# Patient Record
Sex: MALE | Race: WHITE | Employment: FULL TIME | URBAN - METROPOLITAN AREA
[De-identification: names, ages, dates, MRNs, and addresses within clinical notes are randomized per-mention and may not be internally consistent; named-entity substitution may affect disease eponyms.]

---

## 2017-05-12 ENCOUNTER — OFFICE VISIT (OUTPATIENT)
Dept: FAMILY MEDICINE CLINIC | Age: 52
End: 2017-05-12

## 2017-05-12 VITALS
OXYGEN SATURATION: 98 % | RESPIRATION RATE: 16 BRPM | WEIGHT: 146 LBS | SYSTOLIC BLOOD PRESSURE: 128 MMHG | TEMPERATURE: 99 F | DIASTOLIC BLOOD PRESSURE: 68 MMHG | BODY MASS INDEX: 22.91 KG/M2 | HEIGHT: 67 IN | HEART RATE: 94 BPM

## 2017-05-12 DIAGNOSIS — J02.9 VIRAL PHARYNGITIS: Primary | ICD-10-CM

## 2017-05-12 DIAGNOSIS — Z20.818 STREP THROAT EXPOSURE: ICD-10-CM

## 2017-05-12 LAB
S PYO AG THROAT QL: NEGATIVE
VALID INTERNAL CONTROL?: YES

## 2017-05-12 NOTE — PROGRESS NOTES
Subjective:   Re Montoya is a 47 y.o. male who complains of sore throat for 2 days, stable since that time. He also reports some congestion, cough, lack of appetite, and mild nausea. He is concerned he may have strep as his daughter recently had strep. He denies a history of fevers, shortness of breath and wheezing. Evaluation to date: none. Treatment to date: none. Patient does not smoke cigarettes. Relevant PMH:   Past Medical History:   Diagnosis Date    Depression     Dyspepsia and other specified disorders of function of stomach     Elevated triglycerides with high cholesterol     Gallstones 7/10/2013    GERD (gastroesophageal reflux disease)     Headache      Past Surgical History:   Procedure Laterality Date    HX LAP CHOLECYSTECTOMY  8/22/13    by Dr Jony Salinas Levofloxacin Anxiety and Other (comments)     Heart palpitations  Depression         Review of Systems  Pertinent items are noted in HPI. Objective:     Visit Vitals    /68    Pulse 94    Temp 99 °F (37.2 °C) (Oral)    Resp 16    Ht 5' 7\" (1.702 m)    Wt 146 lb (66.2 kg)    SpO2 98%    BMI 22.87 kg/m2     General:  alert, cooperative, no distress   Eyes: negative   Ears: normal TM's and external ear canals AU   Sinuses: Normal paranasal sinuses without tenderness   Mouth:  Lips, mucosa, and tongue normal. Teeth and gums normal and abnormal findings: mild oropharyngeal erythema   Neck: supple, symmetrical, trachea midline and no adenopathy. Heart: S1 and S2 normal, no murmurs noted. Lungs: clear to auscultation bilaterally   Abdomen: soft, non-tender.  Bowel sounds normal. No masses,  no organomegaly        Results for orders placed or performed in visit on 05/12/17   AMB POC RAPID STREP A   Result Value Ref Range    VALID INTERNAL CONTROL POC Yes     Group A Strep Ag Negative Negative       Assessment/Plan:       ICD-10-CM ICD-9-CM    1. Viral pharyngitis J02.9 462 AMB POC RAPID STREP A   2. Strep throat exposure Z20.818 V01.89      Suggested symptomatic OTC remedies. RTC prn.       Deja Khan NP

## 2017-05-12 NOTE — MR AVS SNAPSHOT
Visit Information Date & Time Provider Department Dept. Phone Encounter #  
 5/12/2017 11:30 AM Padmini Betancourt NP [address] [facility] [phone] [id number] Follow-up Instructions Return if symptoms worsen or fail to improve. Upcoming Health Maintenance Date Due DTaP/Tdap/Td series (1 - Tdap) 2/3/1987 FOBT Q 1 YEAR AGE 50-75 2/3/2016 INFLUENZA AGE 9 TO ADULT 8/1/2017 Allergies as of 5/12/2017  Review Complete On: 5/12/2017 By: Padmini Betancourt NP Severity Noted Reaction Type Reactions Levofloxacin  01/20/2013    Anxiety, Other (comments) Heart palpitations Depression Current Immunizations  Never Reviewed No immunizations on file. Not reviewed this visit You Were Diagnosed With   
  
 Codes Comments Viral pharyngitis    -  Primary ICD-10-CM: J02.9 ICD-9-CM: 187 Vitals BP Pulse Temp Resp Height(growth percentile) Weight(growth percentile) 128/68 94 99 °F (37.2 °C) (Oral) 16 5' 7\" (1.702 m) 146 lb (66.2 kg) SpO2 BMI Smoking Status 98% 22.87 kg/m2 Never Smoker BMI and BSA Data Body Mass Index Body Surface Area  
 22.87 kg/m 2 1.77 m 2 Preferred Pharmacy Pharmacy Name Phone [address], [address] Your Updated Medication List  
  
   
This list is accurate as of: 5/12/17 12:05 PM.  Always use your most recent med list.  
  
  
  
  
 cholecalciferol (vitamin D3) 2,000 unit Tab Take  by mouth. LIPITOR 10 mg tablet Generic drug:  atorvastatin Take  by mouth daily. multivitamin tablet Commonly known as:  ONE A DAY Take 1 Tab by mouth daily. PriLOSEC 20 mg capsule Generic drug:  omeprazole Take 20 mg by mouth daily. SEROquel 25 mg tablet Generic drug:  QUEtiapine Take  by mouth nightly. ZOLOFT 100 mg tablet Generic drug:  sertraline Take 100 mg by mouth daily. We Performed the Following AMB POC RAPID STREP A [30620 CPT(R)] Follow-up Instructions Return if symptoms worsen or fail to improve. Patient Instructions Sore Throat: Care Instructions Your Care Instructions Infection by bacteria or a virus causes most sore throats. Cigarette smoke, dry air, air pollution, allergies, and yelling can also cause a sore throat. Sore throats can be painful and annoying. Fortunately, most sore throats go away on their own. If you have a bacterial infection, your doctor may prescribe antibiotics. Follow-up care is a key part of your treatment and safety. Be sure to make and go to all appointments, and call your doctor if you are having problems. It's also a good idea to know your test results and keep a list of the medicines you take. How can you care for yourself at home? · If your doctor prescribed antibiotics, take them as directed. Do not stop taking them just because you feel better. You need to take the full course of antibiotics. · Gargle with warm salt water once an hour to help reduce swelling and relieve discomfort. Use 1 teaspoon of salt mixed in 1 cup of warm water. · Take an over-the-counter pain medicine, such as acetaminophen (Tylenol), ibuprofen (Advil, Motrin), or naproxen (Aleve). Read and follow all instructions on the label. · Be careful when taking over-the-counter cold or flu medicines and Tylenol at the same time. Many of these medicines have acetaminophen, which is Tylenol. Read the labels to make sure that you are not taking more than the recommended dose. Too much acetaminophen (Tylenol) can be harmful. · Drink plenty of fluids. Fluids may help soothe an irritated throat. Hot fluids, such as tea or soup, may help decrease throat pain. · Use over-the-counter throat lozenges to soothe pain. Regular cough drops or hard candy may also help.  These should not be given to young children because of the risk of choking. · Do not smoke or allow others to smoke around you. If you need help quitting, talk to your doctor about stop-smoking programs and medicines. These can increase your chances of quitting for good. · Use a vaporizer or humidifier to add moisture to your bedroom. Follow the directions for cleaning the machine. When should you call for help? Call your doctor now or seek immediate medical care if: 
· You have new or worse trouble swallowing. · Your sore throat gets much worse on one side. Watch closely for changes in your health, and be sure to contact your doctor if you do not get better as expected. Where can you learn more? Go to http://jimenez-charlie.info/. Enter [id number] [id number] [phone] in the search box to learn more about \"Sore Throat: Care Instructions. \" Current as of: July 29, 2016 Content Version: 11.2 © 3893-6072 FiFully. Care instructions adapted under license by Ryla (which disclaims liability or warranty for this information). If you have questions about a medical condition or this instruction, always ask your healthcare professional. [place] [address] any warranty or liability for your use of this information. Introducing [facility]! Dear Sue Phillips: Thank you for requesting a Voalte account. Our records indicate that you already have an active Voalte account. You can access your account anytime at [email or web address] Did you know that you can access your hospital and ER discharge instructions at any time in Voalte? You can also review all of your test results from your hospital stay or ER visit. Additional Information If you have questions, please visit the Frequently Asked Questions section of the Voalte website at [email or web address] Remember, Voalte is NOT to be used for urgent needs. For medical emergencies, dial 911. Now available from your iPhone and Android! Please provide this summary of care documentation to your next provider. Your primary care clinician is listed as Hayde Rose. If you have any questions after today's visit, please call [phone].

## 2017-05-12 NOTE — PATIENT INSTRUCTIONS
Sore Throat: Care Instructions  Your Care Instructions    Infection by bacteria or a virus causes most sore throats. Cigarette smoke, dry air, air pollution, allergies, and yelling can also cause a sore throat. Sore throats can be painful and annoying. Fortunately, most sore throats go away on their own. If you have a bacterial infection, your doctor may prescribe antibiotics. Follow-up care is a key part of your treatment and safety. Be sure to make and go to all appointments, and call your doctor if you are having problems. It's also a good idea to know your test results and keep a list of the medicines you take. How can you care for yourself at home? · If your doctor prescribed antibiotics, take them as directed. Do not stop taking them just because you feel better. You need to take the full course of antibiotics. · Gargle with warm salt water once an hour to help reduce swelling and relieve discomfort. Use 1 teaspoon of salt mixed in 1 cup of warm water. · Take an over-the-counter pain medicine, such as acetaminophen (Tylenol), ibuprofen (Advil, Motrin), or naproxen (Aleve). Read and follow all instructions on the label. · Be careful when taking over-the-counter cold or flu medicines and Tylenol at the same time. Many of these medicines have acetaminophen, which is Tylenol. Read the labels to make sure that you are not taking more than the recommended dose. Too much acetaminophen (Tylenol) can be harmful. · Drink plenty of fluids. Fluids may help soothe an irritated throat. Hot fluids, such as tea or soup, may help decrease throat pain. · Use over-the-counter throat lozenges to soothe pain. Regular cough drops or hard candy may also help. These should not be given to young children because of the risk of choking. · Do not smoke or allow others to smoke around you. If you need help quitting, talk to your doctor about stop-smoking programs and medicines.  These can increase your chances of quitting for good. · Use a vaporizer or humidifier to add moisture to your bedroom. Follow the directions for cleaning the machine. When should you call for help? Call your doctor now or seek immediate medical care if:  · You have new or worse trouble swallowing. · Your sore throat gets much worse on one side. Watch closely for changes in your health, and be sure to contact your doctor if you do not get better as expected. Where can you learn more? Go to http://jimenez-charlie.info/. Enter [phone] [id number] [phone] in the search box to learn more about \"Sore Throat: Care Instructions. \"  Current as of: July 29, 2016  Content Version: 11.2  © 6296-6440 Fon, Incorporated. Care instructions adapted under license by HeatGenie (which disclaims liability or warranty for this information). If you have questions about a medical condition or this instruction, always ask your healthcare professional. [address] any warranty or liability for your use of this information.

## 2017-05-12 NOTE — PROGRESS NOTES
Chief Complaint   Patient presents with    Sore Throat     for 2 days, daughter tested positive for Strep

## 2018-07-24 ENCOUNTER — HOSPITAL ENCOUNTER (EMERGENCY)
Age: 53
Discharge: HOME | End: 2018-07-24
Attending: EMERGENCY MEDICINE
Payer: COMMERCIAL

## 2018-07-24 VITALS
OXYGEN SATURATION: 98 % | HEIGHT: 67 IN | TEMPERATURE: 98 F | DIASTOLIC BLOOD PRESSURE: 67 MMHG | HEART RATE: 78 BPM | BODY MASS INDEX: 22.76 KG/M2 | WEIGHT: 145 LBS | SYSTOLIC BLOOD PRESSURE: 125 MMHG | RESPIRATION RATE: 15 BRPM

## 2018-07-24 DIAGNOSIS — F33.1 MODERATE EPISODE OF RECURRENT MAJOR DEPRESSIVE DISORDER (HCC): Primary | ICD-10-CM

## 2018-07-24 PROCEDURE — 90791 PSYCH DIAGNOSTIC EVALUATION: CPT

## 2018-07-24 PROCEDURE — 99284 EMERGENCY DEPT VISIT MOD MDM: CPT

## 2018-07-24 PROCEDURE — 74011250637 HC RX REV CODE- 250/637: Performed by: EMERGENCY MEDICINE

## 2018-07-24 RX ORDER — LORAZEPAM 1 MG/1
1 TABLET ORAL
Qty: 6 TAB | Refills: 0 | Status: SHIPPED | OUTPATIENT
Start: 2018-07-24

## 2018-07-24 RX ORDER — LORAZEPAM 1 MG/1
1 TABLET ORAL
Status: COMPLETED | OUTPATIENT
Start: 2018-07-24 | End: 2018-07-24

## 2018-07-24 RX ADMIN — LORAZEPAM 1 MG: 1 TABLET ORAL at 21:56

## 2018-07-24 NOTE — LETTER
[facility] EMERGENCY DEPT 
[address] [facility] [address] [address] [address] 
[phone] Work/School Note Date: 7/24/2018 To Whom It May concern: 
 
Cristal Johnson was seen and treated today in the emergency room by the following provider(s): 
Attending Provider: Rashid Bolanos MD.   
 
Cristal Johnson may return to work on 7/26/2018. Sincerely, Rashid Bolanos MD

## 2018-07-24 NOTE — LETTER
[facility] EMERGENCY DEPT 
[address] [place] [address] [address] [address] 
[phone] Work/School Note Date: 7/24/2018 To Whom It May concern: 
 
Mary Allen was seen and treated today in the emergency room by the following provider(s): 
Attending Provider: Jag Olmos MD.   
 
Mary Allen may return to work on 7/26/2018. Sincerely, Jag Olmos MD

## 2018-07-25 NOTE — ED TRIAGE NOTES
Triage note:  Pt arrived with wife and stated he isn't sure why he is here. Per wife pt was referred here by his therapist for depression medication. Wife stated pt isn't eating and boyd all the time.   Pt denies feeling suicidal.

## 2018-07-25 NOTE — DISCHARGE INSTRUCTIONS
Depression and Chronic Disease: Care Instructions  Your Care Instructions    A chronic disease is one that you have for a long time. Some chronic diseases can be controlled, but they usually cannot be cured. Depression is common in people with chronic diseases, but it often goes unnoticed. Many people have concerns about seeking treatment for a mental health problem. You may think it's a sign of weakness, or you don't want people to know about it. It's important to overcome these reasons for not seeking treatment. Treating depression or anxiety is good for your health. Follow-up care is a key part of your treatment and safety. Be sure to make and go to all appointments, and call your doctor if you are having problems. It's also a good idea to know your test results and keep a list of the medicines you take. How can you care for yourself at home? Watch for symptoms of depression  The symptoms of depression are often subtle at first. You may think they are caused by your disease rather than depression. Or you may think it is normal to be depressed when you have a chronic disease. If you are depressed you may:  · Feel sad or hopeless. · Feel guilty or worthless. · Not enjoy the things you used to enjoy. · Feel hopeless, as though life is not worth living. · Have trouble thinking or remembering. · Have low energy, and you may not eat or sleep well. · Pull away from others. · Think often about death or killing yourself. (Keep the numbers for these national suicide hotlines: 8-469-747-TALK [1-813.230.2517] and 4-578-TELPSDU [1-166.296.5410]. )  Get treatment  By treating your depression, you can feel more hopeful and have more energy. If you feel better, you may take better care of yourself, so your health may improve. · Talk to your doctor if you have any changes in mood during treatment for your disease. · Ask your doctor for help.  Counseling, antidepressant medicine, or a combination of the two can help most people with depression. Often a combination works best. Counseling can also help you cope with having a chronic disease. When should you call for help? Call 911 anytime you think you may need emergency care. For example, call if:    · You feel like hurting yourself or someone else.     · Someone you know has depression and is about to attempt or is attempting suicide.   [facility] your doctor now or seek immediate medical care if:    · You hear voices.     · Someone you know has depression and:  ¨ Starts to give away his or her possessions. ¨ Uses illegal drugs or drinks alcohol heavily. ¨ Talks or writes about death, including writing suicide notes or talking about guns, knives, or pills. ¨ Starts to spend a lot of time alone. ¨ Acts very aggressively or suddenly appears calm.    Watch closely for changes in your health, and be sure to contact your doctor if:    · You do not get better as expected. Where can you learn more? Go to http://jimenez-charlie.info/. Enter I240 in the search box to learn more about \"Depression and Chronic Disease: Care Instructions. \"  Current as of: December 7, 2017  Content Version: 11.7  © 4268-7835 Centrillion Biosciences, Incorporated. Care instructions adapted under license by Profig (which disclaims liability or warranty for this information). If you have questions about a medical condition or this instruction, always ask your healthcare professional. [address] any warranty or liability for your use of this information.

## 2018-07-25 NOTE — BSMART NOTE
Comprehensive Assessment Form Part 1      Section I - Disposition    Axis I - Major Depressive Disorder                 Anxiety Disorder                Alcohol Dependence                   Axis II - Deferred  Axis III -   Past Medical History Date Comments      Headache(784.0) [R51]       Depression [F32.9]       Dyspepsia and other specified disorders of function of stomach [K31.89, R10.13]       Gallstones [K80.20] 7/10/2013      GERD (gastroesophageal reflux disease) [K21.9]       Elevated triglycerides with high cholesterol [E78.2]         Axis IV - No reported stressors  Axis V -       The Medical Doctor to Psychiatrist conference was not completed. The Medical Doctor is in agreement with Psychiatrist disposition because of (reason) patient is not seeking an admission and does not require a TDO. The plan is discharge, patient given resources to connect with psychiatrist.  The on-call Psychiatrist consulted was Dr. Rainer Ayala. The admitting Psychiatrist will be Dr. Nidia Hinkle. The admitting Diagnosis is none. The Payor source is [employer]. The name of the representative was . This was approved for  days. The authorization number is . Section II - Integrated Summary  Summary:  Patient is 47year old male,Pt arrived with wife and stated he isn't sure why he is here. Per wife pt was referred here by his therapist for depression medication. Wife stated pt isn't eating and boyd all the time. Pt denies feeling suicidal. Assessment completed via tele psych, patient denied suicidal/ homicidal thoughts; denied hallucinations. Patient hasnot been previously hospitalized. Patient was seen by a psychiatrist about four years ago and after his insurance changed he could not see doc anymore. Patient has continued to be medicated by his PCP with Zoloft prescription. Patient reported over the past 4-5 days he has had increased depression without reason, he has been crying and not eating.  Patient denied any changes or stressors. Patient reported that he was on vacation prior to having the increased depression. Patient reported he hasnot been to work over past two days due to how he was feeling. Patient has been seen by a therapist. Patient reported that he has suffered from depression for a while but he hasnot had an episode like this in the past. Patient reported feeling safe with himself, and his wife feels safe with him at home. They are willing to connect with resources. The patienthas demonstrated mental capacity to provide informed consent. The information is given by the patient and spouse/SO. The Chief Complaint is depression./ anxiety. The Precipitant Factors are h/x of depression, drinking daily as reported. Previous Hospitalizations: no  The patient has not previously been in restraints. Current Psychiatrist and/or  is none. Lethality Assessment:    The potential for suicide noted by the following: not noted . The potential for homicide is not noted. The patient has not been a perpetrator of sexual or physical abuse. There are not pending charges. The patient is not felt to be at risk for self harm or harm to others. The attending nurse was advised not noted. Section III - Psychosocial  The patient's overall mood and attitude is calm and cooperative. Low mood. Feelings of helplessness and hopelessness are not observed. Generalized anxiety is not observed. Panic is not observed. Phobias are not observed. Obsessive compulsive tendencies are not observed. Section IV - Mental Status Exam  The patient's appearance shows no evidence of impairment. The patient's behavior shows no evidence of impairment. The patient is oriented to time, place, person and situation. The patient's speech shows no evidence of impairment. The patient's mood is depressed. The range of affect shows no evidence of impairment.   The patient's thought content demonstrates no evidence of impairment. The thought process shows no evidence of impairment. The patient's perception shows no evidence of impairment. The patient's memory shows no evidence of impairment. The patient's appetite shows no evidence of impairment. The patient's sleep shows no evidence of impairment. The patient's insight shows no evidence of impairment. The patient's judgement shows no evidence of impairment. Section V - Substance Abuse  The patient is using substances. The patient is using alcohol for greater than 10 years with last use on yesterday and cannabis by inhalation for 5-10 years with last use on occassionally use. The patient has experienced the following withdrawal symptoms: N/A. Section VI - Living Arrangements  The patient is . The spouses approximate age is 52 and appears to be in good health. The patient lives with a spouse and with a child/children. The patient has one child age . The patient does plan to return home upon discharge. The patient does not have legal issues pending. The patient's source of income comes from employment and family. Sabianist and cultural practices have not been voiced at this time. The patient's greatest support comes from family and this person will be involved with the treatment. The patient has not been in an event described as horrible or outside the realm of ordinary life experience either currently or in the past.  The patient has not been a victim of sexual/physical abuse. Section VII - Other Areas of Clinical Concern  The highest grade achieved is unknown with the overall quality of school experience being described as unknown. The patient is currently employed and speaks [place] as a primary language. The patient has no communication impairments affecting communication. The patient's preference for learning can be described as: can read and write adequately.   The patient's hearing is normal.  The patient's vision is normal.      Bobbye Spray Deejay Guidry

## 2018-07-25 NOTE — ED PROVIDER NOTES
HPI Comments: 51yM with PMH sig4 depression x>12 yrs on zoloft p/w worsening depression x months. Past 4 days with decreased eating and increasing anxiety. Saw therapist today and advised to come to ER for anxiety. Denies SI/HI. Managed by PMD currently with zoloft for MDD x 12 yrs. No other complaints. Denies changed in urination despite decreased po. Denies cp/sob/n/v/d/c/f/c. Denies tob, drugs  +etoh          Therapist-Dr. Yesi Elias    The history is provided by the patient. Past Medical History:   Diagnosis Date    Depression     Dyspepsia and other specified disorders of function of stomach     Elevated triglycerides with high cholesterol     Gallstones 7/10/2013    GERD (gastroesophageal reflux disease)     Headache(784.0)        Past Surgical History:   Procedure Laterality Date    HX LAP CHOLECYSTECTOMY  8/22/13    by Dr Rao Major           Family History:   Problem Relation Age of Onset    Hypertension Maternal Grandmother     Stroke Maternal Grandmother     Cancer Maternal Grandfather      lung       Social History     Social History    Marital status:      Spouse name: N/A    Number of children: N/A    Years of education: N/A     Occupational History    Not on file. Social History Main Topics    Smoking status: Never Smoker    Smokeless tobacco: Never Used    Alcohol use Yes      Comment: 12beers a week    Drug use: No    Sexual activity: Yes     Other Topics Concern    Not on file     Social History Narrative         ALLERGIES: Levofloxacin    Review of Systems   Constitutional: Negative for chills and fever. HENT: Negative for congestion, nosebleeds and sore throat. Eyes: Negative for pain and discharge. Respiratory: Negative for cough and shortness of breath. Cardiovascular: Negative for chest pain and palpitations. Gastrointestinal: Negative for abdominal pain, constipation, nausea and vomiting.    Genitourinary: Negative for decreased urine volume, dysuria, flank pain and urgency. Musculoskeletal: Negative for gait problem and myalgias. Skin: Negative for rash and wound. Neurological: Negative for seizures and syncope. Hematological: Does not bruise/bleed easily. Psychiatric/Behavioral: Positive for dysphoric mood. Negative for confusion, self-injury and suicidal ideas. The patient is nervous/anxious. Vitals:    07/24/18 2050   BP: 135/69   Pulse: 78   Resp: 20   Temp: 98.4 °F (36.9 °C)   SpO2: 98%   Weight: 65.8 kg (145 lb)   Height: 5' 7\" (1.702 m)            Physical Exam   Constitutional: He is oriented to person, place, and time. He appears well-developed and well-nourished. HENT:   Head: Normocephalic and atraumatic. Eyes: EOM are normal. Pupils are equal, round, and reactive to light. Neck: Normal range of motion. Neck supple. Cardiovascular: Normal rate, regular rhythm, normal heart sounds and intact distal pulses. Pulmonary/Chest: Effort normal and breath sounds normal. No respiratory distress. He has no wheezes. Abdominal: Soft. Bowel sounds are normal. There is no tenderness. There is no rebound and no guarding. Musculoskeletal: Normal range of motion. Neurological: He is alert and oriented to person, place, and time. Skin: Skin is warm and dry. Psychiatric: His behavior is normal. He exhibits a depressed mood. He expresses no homicidal and no suicidal ideation. He expresses no suicidal plans and no homicidal plans. Nursing note and vitals reviewed. MDM  Number of Diagnoses or Management Options  Moderate episode of recurrent major depressive disorder [facility]):   Diagnosis management comments: 53yM with MDD p/w increasing anxiety x 4 days with decreased appetite. Denies SI. [facility]smart c/w for outpt resources for fu and psych management. Declines blood work now.     Patient Progress  Patient progress: stable        ED Course       Procedures      10:18 PM  Manule Garcia MD spoke with Addy Foster, Consult for [facility]. Discussed available diagnostic tests and clinical findings. He/She is in agreement with care plans as outlined. He/she will see patient and evaluate. 11:06 PM  Patient's results have been reviewed with them. Patient and/or family have verbally conveyed their understanding and agreement of the patient's signs, symptoms, diagnosis, treatment and prognosis and additionally agree to follow up as recommended or return to the Emergency Room should their condition change prior to follow-up. Discharge instructions have also been provided to the patient with some educational information regarding their diagnosis as well a list of reasons why they would want to return to the ER prior to their follow-up appointment should their condition change.

## 2018-07-25 NOTE — ED NOTES
Discharge note: The patient was discharged home in stable condition, accompanied by family member. The patient is alert and oriented, is in no respiratory distress and has vital signs within normal limits. The patient's diagnosis, condition and treatment were explained to patient by Dr Onesimo Marsh. The patient expressed understanding of discharge instructions, prescriptions, and plan of care. A work note was given to pt. A discharge plan has been developed. A  was not involved in the process. Patient offered a wheelchair to ED lobby for discharge but declined at this time. Patient ambulatory with a steady gate to ED lobby to go home with family member.